# Patient Record
Sex: MALE | Race: WHITE | NOT HISPANIC OR LATINO | Employment: FULL TIME | URBAN - METROPOLITAN AREA
[De-identification: names, ages, dates, MRNs, and addresses within clinical notes are randomized per-mention and may not be internally consistent; named-entity substitution may affect disease eponyms.]

---

## 2017-05-05 ENCOUNTER — OFFICE VISIT (OUTPATIENT)
Dept: INTERNAL MEDICINE | Facility: CLINIC | Age: 27
End: 2017-05-05

## 2017-05-05 VITALS
DIASTOLIC BLOOD PRESSURE: 70 MMHG | SYSTOLIC BLOOD PRESSURE: 122 MMHG | WEIGHT: 166.7 LBS | OXYGEN SATURATION: 98 % | BODY MASS INDEX: 26.16 KG/M2 | HEIGHT: 67 IN | TEMPERATURE: 98.5 F | HEART RATE: 55 BPM

## 2017-05-05 DIAGNOSIS — L30.9 DERMATITIS: ICD-10-CM

## 2017-05-05 DIAGNOSIS — E55.9 VITAMIN D DEFICIENCY: ICD-10-CM

## 2017-05-05 DIAGNOSIS — R53.83 FATIGUE, UNSPECIFIED TYPE: ICD-10-CM

## 2017-05-05 DIAGNOSIS — E78.2 MIXED HYPERLIPIDEMIA: Primary | ICD-10-CM

## 2017-05-05 PROCEDURE — 99213 OFFICE O/P EST LOW 20 MIN: CPT | Performed by: FAMILY MEDICINE

## 2017-05-06 LAB
25(OH)D3+25(OH)D2 SERPL-MCNC: 22.2 NG/ML (ref 30–100)
BASOPHILS # BLD AUTO: 0.03 10*3/MM3 (ref 0–0.2)
BASOPHILS NFR BLD AUTO: 0.5 % (ref 0–1.5)
CHOLEST SERPL-MCNC: 279 MG/DL (ref 0–200)
EOSINOPHIL # BLD AUTO: 0.22 10*3/MM3 (ref 0–0.7)
EOSINOPHIL NFR BLD AUTO: 4 % (ref 0.3–6.2)
ERYTHROCYTE [DISTWIDTH] IN BLOOD BY AUTOMATED COUNT: 12.5 % (ref 11.5–14.5)
HCT VFR BLD AUTO: 46.2 % (ref 40.4–52.2)
HDLC SERPL-MCNC: 33 MG/DL (ref 40–60)
HGB BLD-MCNC: 15.8 G/DL (ref 13.7–17.6)
IMM GRANULOCYTES # BLD: 0 10*3/MM3 (ref 0–0.03)
IMM GRANULOCYTES NFR BLD: 0 % (ref 0–0.5)
LDLC SERPL CALC-MCNC: 225 MG/DL (ref 0–100)
LYMPHOCYTES # BLD AUTO: 2.2 10*3/MM3 (ref 0.9–4.8)
LYMPHOCYTES NFR BLD AUTO: 39.6 % (ref 19.6–45.3)
MCH RBC QN AUTO: 31.1 PG (ref 27–32.7)
MCHC RBC AUTO-ENTMCNC: 34.2 G/DL (ref 32.6–36.4)
MCV RBC AUTO: 90.9 FL (ref 79.8–96.2)
MONOCYTES # BLD AUTO: 0.43 10*3/MM3 (ref 0.2–1.2)
MONOCYTES NFR BLD AUTO: 7.7 % (ref 5–12)
NEUTROPHILS # BLD AUTO: 2.68 10*3/MM3 (ref 1.9–8.1)
NEUTROPHILS NFR BLD AUTO: 48.2 % (ref 42.7–76)
PLATELET # BLD AUTO: 207 10*3/MM3 (ref 140–500)
RBC # BLD AUTO: 5.08 10*6/MM3 (ref 4.6–6)
TRIGL SERPL-MCNC: 105 MG/DL (ref 0–150)
VLDLC SERPL CALC-MCNC: 21 MG/DL (ref 5–40)
WBC # BLD AUTO: 5.56 10*3/MM3 (ref 4.5–10.7)

## 2017-05-11 ENCOUNTER — TELEPHONE (OUTPATIENT)
Dept: INTERNAL MEDICINE | Facility: CLINIC | Age: 27
End: 2017-05-11

## 2017-05-11 DIAGNOSIS — E78.9 ELEVATED SERUM CHOLESTEROL: Primary | ICD-10-CM

## 2017-06-09 ENCOUNTER — OFFICE VISIT (OUTPATIENT)
Dept: ENDOCRINOLOGY | Age: 27
End: 2017-06-09

## 2017-06-09 VITALS
HEART RATE: 64 BPM | SYSTOLIC BLOOD PRESSURE: 120 MMHG | WEIGHT: 165 LBS | OXYGEN SATURATION: 98 % | HEIGHT: 67 IN | DIASTOLIC BLOOD PRESSURE: 70 MMHG | BODY MASS INDEX: 25.9 KG/M2

## 2017-06-09 DIAGNOSIS — E78.2 MIXED HYPERLIPIDEMIA: Primary | ICD-10-CM

## 2017-06-09 DIAGNOSIS — Z82.49 FAMILY HISTORY OF HEART ATTACK: ICD-10-CM

## 2017-06-09 LAB
ALBUMIN SERPL-MCNC: 5 G/DL (ref 3.5–5.2)
ALBUMIN/GLOB SERPL: 2.2 G/DL
ALP SERPL-CCNC: 95 U/L (ref 39–117)
ALT SERPL-CCNC: 24 U/L (ref 1–41)
AST SERPL-CCNC: 31 U/L (ref 1–40)
BILIRUB SERPL-MCNC: 0.3 MG/DL (ref 0.1–1.2)
BUN SERPL-MCNC: 17 MG/DL (ref 6–20)
BUN/CREAT SERPL: 16.2 (ref 7–25)
CALCIUM SERPL-MCNC: 10.1 MG/DL (ref 8.6–10.5)
CHLORIDE SERPL-SCNC: 101 MMOL/L (ref 98–107)
CHOLEST SERPL-MCNC: 278 MG/DL (ref 0–200)
CO2 SERPL-SCNC: 28.6 MMOL/L (ref 22–29)
CREAT SERPL-MCNC: 1.05 MG/DL (ref 0.76–1.27)
GLOBULIN SER CALC-MCNC: 2.3 GM/DL
GLUCOSE SERPL-MCNC: 91 MG/DL (ref 65–99)
HBA1C MFR BLD: 5.02 % (ref 4.8–5.6)
HDLC SERPL-MCNC: 37 MG/DL (ref 40–60)
LDLC SERPL CALC-MCNC: 224 MG/DL (ref 0–100)
POTASSIUM SERPL-SCNC: 4.8 MMOL/L (ref 3.5–5.2)
PROT SERPL-MCNC: 7.3 G/DL (ref 6–8.5)
SODIUM SERPL-SCNC: 141 MMOL/L (ref 136–145)
TRIGL SERPL-MCNC: 84 MG/DL (ref 0–150)
VLDLC SERPL CALC-MCNC: 16.8 MG/DL (ref 5–40)

## 2017-06-09 PROCEDURE — 99244 OFF/OP CNSLTJ NEW/EST MOD 40: CPT | Performed by: INTERNAL MEDICINE

## 2017-06-09 NOTE — PROGRESS NOTES
Chief Complaint   Patient presents with   • Hyperlipidemia   New Patient appointment / Elevated Serum Cholesterol     HPI  Jay Rasheed,27 y.o. WM is here as a new pt for hyperlipidemia. Consulted by Dr. Pichardo.   Per pt about a year ago he wanted his PCP to check his cholesterol levels as it runs in his family. During that period he was trying to body build with whey protein powders and eating a pound red meat but when he realized his elevated cholesterol he started watching diet.   Now he eats meat during week ends - about 4 ounces, drinks skimmed milk used to drink whole milk before, Drinks about 4 scoops of Pea protein with no cholesterol in it. 30 - 45 mins of exercise every single day.   Strong family hx of heart attacks in his grand parents - at age 50's.     [place] heart risk - 4%, again the study doesn't have his age group.     I have reviewed the patient's allergies, medicines, past medical hx, family hx and social hx in detail.    No past medical history on file.    Family History   Problem Relation Age of Onset   • Alcohol abuse Mother    • Irregular heart beat Father    • Hypertension Father    • Alcohol abuse Father    • Glaucoma Maternal Grandmother    • Stroke Maternal Grandfather    • Heart attack Paternal Grandfather    • Hypertension Paternal Grandfather    • Pancreatic cancer Paternal Grandfather    • Liver cancer Paternal Grandfather    • Nephrolithiasis Cousin        Social History     Social History   • Marital status: Unknown     Spouse name: N/A   • Number of children: 0   • Years of education: N/A     Occupational History   •       Social History Main Topics   • Smoking status: Never Smoker   • Smokeless tobacco: Never Used   • Alcohol use Yes      Comment: 3 per week   • Drug use: Not on file   • Sexual activity: Yes     Partners: Female     Other Topics Concern   • Not on file     Social History Narrative       Allergies   Allergen Reactions   • Sulfa Antibiotics Hives  "and Rash       No current outpatient prescriptions on file.     Review of Systems   Constitutional: Negative for fever.   HENT: Negative for facial swelling, nosebleeds, trouble swallowing and voice change.    Eyes: Negative for pain and redness.   Respiratory: Negative for shortness of breath and wheezing.    Cardiovascular: Positive for palpitations. Negative for leg swelling.   Gastrointestinal: Negative for abdominal pain, diarrhea and vomiting.   Endocrine: Negative for polydipsia and polyuria.   Genitourinary: Negative for decreased urine volume and frequency.   Musculoskeletal: Positive for neck pain. Negative for joint swelling.   Skin: Negative for rash.   Allergic/Immunologic: Negative for immunocompromised state.   Neurological: Negative for seizures and facial asymmetry.   Hematological: Does not bruise/bleed easily.   Psychiatric/Behavioral: Negative for agitation and confusion.       Objective:    /70  Pulse 64  Ht 67\" (170.2 cm)  Wt 165 lb (74.8 kg)  SpO2 98%  BMI 25.84 kg/m2    Physical Exam   Constitutional: He is oriented to person, place, and time. He appears well-nourished.   HENT:   Head: Normocephalic and atraumatic.   Eyes: Conjunctivae and EOM are normal.   No lipemia   Neck: Normal range of motion. Neck supple. Carotid bruit is not present. No thyromegaly present.   No acanthosis nigricans   Cardiovascular: Normal rate and normal heart sounds.    HR - 64   Pulmonary/Chest: Effort normal and breath sounds normal. No stridor. No respiratory distress. He has no wheezes.   Abdominal: Soft. Bowel sounds are normal. He exhibits no distension. There is no tenderness.   Musculoskeletal: He exhibits no edema or tenderness.   No xanthomas   Lymphadenopathy:     He has no cervical adenopathy.   Neurological: He is alert and oriented to person, place, and time.   Skin: Skin is warm and dry.   Psychiatric: He has a normal mood and affect. His behavior is normal.   Vitals reviewed.      Results " Review:    I reviewed the patient's new clinical results.    Office Visit on 05/05/2017   Component Date Value Ref Range Status   • Total Cholesterol 05/05/2017 279* 0 - 200 mg/dL Final   • Triglycerides 05/05/2017 105  0 - 150 mg/dL Final   • HDL Cholesterol 05/05/2017 33* 40 - 60 mg/dL Final   • VLDL Cholesterol 05/05/2017 21  5 - 40 mg/dL Final   • LDL Cholesterol  05/05/2017 225* 0 - 100 mg/dL Final   • 25 Hydroxy, Vitamin D 05/05/2017 22.2* 30.0 - 100.0 ng/mL Final    Comment: Reference Range for Total Vitamin D 25(OH)  Deficiency    <20.0 ng/mL  Insufficiency 21-29 ng/mL  Sufficiency    ng/mL  Toxicity      >100 ng/ml        • WBC 05/05/2017 5.56  4.50 - 10.70 10*3/mm3 Final   • RBC 05/05/2017 5.08  4.60 - 6.00 10*6/mm3 Final   • Hemoglobin 05/05/2017 15.8  13.7 - 17.6 g/dL Final   • Hematocrit 05/05/2017 46.2  40.4 - 52.2 % Final   • MCV 05/05/2017 90.9  79.8 - 96.2 fL Final   • MCH 05/05/2017 31.1  27.0 - 32.7 pg Final   • MCHC 05/05/2017 34.2  32.6 - 36.4 g/dL Final   • RDW 05/05/2017 12.5  11.5 - 14.5 % Final   • Platelets 05/05/2017 207  140 - 500 10*3/mm3 Final   • Neutrophil Rel % 05/05/2017 48.2  42.7 - 76.0 % Final   • Lymphocyte Rel % 05/05/2017 39.6  19.6 - 45.3 % Final   • Monocyte Rel % 05/05/2017 7.7  5.0 - 12.0 % Final   • Eosinophil Rel % 05/05/2017 4.0  0.3 - 6.2 % Final   • Basophil Rel % 05/05/2017 0.5  0.0 - 1.5 % Final   • Neutrophils Absolute 05/05/2017 2.68  1.90 - 8.10 10*3/mm3 Final   • Lymphocytes Absolute 05/05/2017 2.20  0.90 - 4.80 10*3/mm3 Final   • Monocytes Absolute 05/05/2017 0.43  0.20 - 1.20 10*3/mm3 Final   • Eosinophils Absolute 05/05/2017 0.22  0.00 - 0.70 10*3/mm3 Final   • Basophils Absolute 05/05/2017 0.03  0.00 - 0.20 10*3/mm3 Final   • Immature Granulocyte Rel % 05/05/2017 0.0  0.0 - 0.5 % Final   • Immature Grans Absolute 05/05/2017 0.00  0.00 - 0.03 10*3/mm3 Final       Jay was seen today for hyperlipidemia.    Diagnoses and all orders for this  visit:    Mixed hyperlipidemia  -     Hemoglobin A1c  -     Comprehensive Metabolic Panel  -     Lipid Panel  -     Lipid Panel; Future    Family history of heart attack  -     Hemoglobin A1c  -     Comprehensive Metabolic Panel  -     Lipid Panel  -     Lipid Panel; Future    Hyperlipidemia  Counseled patient on watching her fat diet.  Advised patient to continue to exercise.  Counseled patient on the adverse affects of trying to use protein powders for body building.  Will repeat the lipid panel today.  Based on the results would consider starting the patient on statin.  Counseled the patient on the side effects of statins and the benefit on them.    Calculated the [place] heart risk score for the patient and gave him the handout.  Explained the risks and benefits of starting a statin at his age.    Will check HbA1c, CMP to look at risk of diabetes and renal function and LFTs.    Thank you for asking me to see your patient Jay Rasheed in consultation.    31 minutes out of 60 minutes face to face spent in counseling the patient extensively on statins, side effects and cardiac risk in 10 years.    Mariia Menjivar MD  06/09/17

## 2017-06-10 NOTE — PROGRESS NOTES
Mail results to pt, no treatment changes at this time. LDL is getting better, follow the plan as discussed in clinic will recheck levels in 3 months

## 2017-09-07 ENCOUNTER — RESULTS ENCOUNTER (OUTPATIENT)
Dept: ENDOCRINOLOGY | Age: 27
End: 2017-09-07

## 2017-09-07 DIAGNOSIS — E78.2 MIXED HYPERLIPIDEMIA: ICD-10-CM

## 2017-09-07 DIAGNOSIS — Z82.49 FAMILY HISTORY OF HEART ATTACK: ICD-10-CM

## 2018-03-22 DIAGNOSIS — R73.03 PRE-DIABETES: ICD-10-CM

## 2018-03-22 DIAGNOSIS — E78.2 MIXED HYPERLIPIDEMIA: ICD-10-CM

## 2018-03-22 DIAGNOSIS — E78.5 HYPERLIPIDEMIA, UNSPECIFIED HYPERLIPIDEMIA TYPE: Primary | ICD-10-CM

## 2018-03-23 ENCOUNTER — OFFICE VISIT (OUTPATIENT)
Dept: INTERNAL MEDICINE | Facility: CLINIC | Age: 28
End: 2018-03-23

## 2018-03-23 ENCOUNTER — TELEPHONE (OUTPATIENT)
Dept: INTERNAL MEDICINE | Facility: CLINIC | Age: 28
End: 2018-03-23

## 2018-03-23 ENCOUNTER — HOSPITAL ENCOUNTER (OUTPATIENT)
Dept: GENERAL RADIOLOGY | Facility: HOSPITAL | Age: 28
Discharge: HOME | End: 2018-03-23
Admitting: FAMILY MEDICINE

## 2018-03-23 VITALS
WEIGHT: 177.3 LBS | RESPIRATION RATE: 16 BRPM | OXYGEN SATURATION: 98 % | DIASTOLIC BLOOD PRESSURE: 78 MMHG | BODY MASS INDEX: 27.83 KG/M2 | HEART RATE: 77 BPM | HEIGHT: 67 IN | TEMPERATURE: 98.7 F | SYSTOLIC BLOOD PRESSURE: 123 MMHG

## 2018-03-23 DIAGNOSIS — L72.9 SKIN CYST: Primary | ICD-10-CM

## 2018-03-23 DIAGNOSIS — M25.551 PAIN OF RIGHT HIP JOINT: ICD-10-CM

## 2018-03-23 PROCEDURE — 73502 X-RAY EXAM HIP UNI 2-3 VIEWS: CPT

## 2018-03-23 PROCEDURE — 99214 OFFICE O/P EST MOD 30 MIN: CPT | Performed by: FAMILY MEDICINE

## 2018-03-23 RX ORDER — MULTIVITAMIN WITH IRON
TABLET ORAL
COMMUNITY
End: 2018-04-20

## 2018-03-23 RX ORDER — IBUPROFEN 600 MG/1
600 TABLET ORAL EVERY 6 HOURS PRN
COMMUNITY
End: 2018-03-23

## 2018-03-23 RX ORDER — CHLORAL HYDRATE 500 MG
CAPSULE ORAL
COMMUNITY
End: 2018-04-20

## 2018-03-23 NOTE — PROGRESS NOTES
Jay Rasheed is a 28 y.o. male.      Assessment/Plan   Problem List Items Addressed This Visit        Nervous and Auditory    Pain of right hip joint    Overview     chronic         Relevant Orders    XR Hip With or Without Pelvis 2 - 3 View Right (Completed)       Musculoskeletal and Integument    Skin cyst - Primary    Relevant Medications    tretinoin (RETIN-A) 0.025 % cream      Other Visit Diagnoses    None.        and continue therapy with present situation for ongoing relief of his right hip girdle tenderness pain with radiating qualities to his right shoulder up with scrotum lesion does not improve  Follow-up results of x-ray normal        Chief Complaint   Patient presents with   • bump on right testicle     x 6 months, itches   • Hip Pain     right, x 8 years. PT in past but no help     Social History   Substance Use Topics   • Smoking status: Never Smoker   • Smokeless tobacco: Never Used   • Alcohol use Yes      Comment: 3 per week       History of Present Illness   Patient has noticed a lesion on his scrotum that has had some intermittent itching over the last 6 months he's tried to remove it with his wife is a nurse and has not had any success does not bleed and he has no other lesions he is concerned about there is no lumps in his testicles he is concerned about.  He refuses a testicular exam, and we discussed the significance of testicular cancer in his age group.  He also is quite relate that he has had right-sided hip discomfort for many years he is had multiple treatments without much success but is finally found a healer that is working with him for muscle coordination and control and seems to be improving his symptoms remarkably he would like spelled there is any other  form of therapy that would be covered through his insurance is depressed nasal seen is not covered through insurance.  He has been through physical therapy in the past without any benefit  The following portions of the  "patient's history were reviewed and updated as appropriate:PMHroutine: Social history , Past Medical History, Allergies, Current Medications, Active Problem List and Health Maintenance    Review of Systems   Constitutional: Negative.    Respiratory: Negative.    Cardiovascular: Negative.    Gastrointestinal: Negative.    Genitourinary: Negative.    Musculoskeletal: Positive for arthralgias, gait problem and myalgias.   Skin: Negative.        Objective   Vitals:    03/23/18 0906   BP: 123/78   BP Location: Left arm   Patient Position: Sitting   Cuff Size: Large Adult   Pulse: 77   Resp: 16   Temp: 98.7 °F (37.1 °C)   TempSrc: Oral   SpO2: 98%   Weight: 80.4 kg (177 lb 4.8 oz)   Height: 170.2 cm (67\")     Body mass index is 27.77 kg/m².  Physical Exam   Genitourinary:           Reviewed Data:  No visits with results within 1 Month(s) from this visit.   Latest known visit with results is:   Office Visit on 06/09/2017   Component Date Value Ref Range Status   • Hemoglobin A1C 06/09/2017 5.02  4.80 - 5.60 % Final    Comment: Hemoglobin A1C Ranges:  Increased Risk for Diabetes  5.7% to 6.4%  Diabetes                     >= 6.5%  Diabetic Goal                < 7.0%     • Glucose 06/09/2017 91  65 - 99 mg/dL Final   • BUN 06/09/2017 17  6 - 20 mg/dL Final   • Creatinine 06/09/2017 1.05  0.76 - 1.27 mg/dL Final   • eGFR Non  Am 06/09/2017 86  >60 mL/min/1.73 Final   • eGFR African Am 06/09/2017 104  >60 mL/min/1.73 Final   • BUN/Creatinine Ratio 06/09/2017 16.2  7.0 - 25.0 Final   • Sodium 06/09/2017 141  136 - 145 mmol/L Final   • Potassium 06/09/2017 4.8  3.5 - 5.2 mmol/L Final   • Chloride 06/09/2017 101  98 - 107 mmol/L Final   • Total CO2 06/09/2017 28.6  22.0 - 29.0 mmol/L Final   • Calcium 06/09/2017 10.1  8.6 - 10.5 mg/dL Final   • Total Protein 06/09/2017 7.3  6.0 - 8.5 g/dL Final   • Albumin 06/09/2017 5.00  3.50 - 5.20 g/dL Final   • Globulin 06/09/2017 2.3  gm/dL Final   • A/G Ratio 06/09/2017 2.2  g/dL " Final   • Total Bilirubin 06/09/2017 0.3  0.1 - 1.2 mg/dL Final   • Alkaline Phosphatase 06/09/2017 95  39 - 117 U/L Final   • AST (SGOT) 06/09/2017 31  1 - 40 U/L Final   • ALT (SGPT) 06/09/2017 24  1 - 41 U/L Final   • Total Cholesterol 06/09/2017 278* 0 - 200 mg/dL Final   • Triglycerides 06/09/2017 84  0 - 150 mg/dL Final   • HDL Cholesterol 06/09/2017 37* 40 - 60 mg/dL Final   • VLDL Cholesterol 06/09/2017 16.8  5 - 40 mg/dL Final   • LDL Cholesterol  06/09/2017 224* 0 - 100 mg/dL Final

## 2018-03-24 DIAGNOSIS — M25.551 PAIN OF RIGHT HIP JOINT: Primary | ICD-10-CM

## 2018-03-27 ENCOUNTER — RESULTS ENCOUNTER (OUTPATIENT)
Dept: ENDOCRINOLOGY | Age: 28
End: 2018-03-27

## 2018-03-27 DIAGNOSIS — E78.2 MIXED HYPERLIPIDEMIA: ICD-10-CM

## 2018-03-27 DIAGNOSIS — R73.03 PRE-DIABETES: ICD-10-CM

## 2018-03-27 DIAGNOSIS — E78.5 HYPERLIPIDEMIA, UNSPECIFIED HYPERLIPIDEMIA TYPE: ICD-10-CM

## 2018-03-29 ENCOUNTER — RESULTS ENCOUNTER (OUTPATIENT)
Dept: ENDOCRINOLOGY | Age: 28
End: 2018-03-29

## 2018-03-29 DIAGNOSIS — R73.03 PRE-DIABETES: ICD-10-CM

## 2018-03-29 DIAGNOSIS — E78.2 MIXED HYPERLIPIDEMIA: ICD-10-CM

## 2018-03-29 DIAGNOSIS — E78.5 HYPERLIPIDEMIA, UNSPECIFIED HYPERLIPIDEMIA TYPE: ICD-10-CM

## 2018-04-07 LAB
ALBUMIN SERPL-MCNC: 5 G/DL (ref 3.5–5.2)
ALBUMIN/GLOB SERPL: 2.4 G/DL
ALP SERPL-CCNC: 99 U/L (ref 39–117)
ALT SERPL-CCNC: 25 U/L (ref 1–41)
AST SERPL-CCNC: 35 U/L (ref 1–40)
BILIRUB SERPL-MCNC: 0.4 MG/DL (ref 0.1–1.2)
BUN SERPL-MCNC: 13 MG/DL (ref 6–20)
BUN/CREAT SERPL: 11.6 (ref 7–25)
CALCIUM SERPL-MCNC: 10.3 MG/DL (ref 8.6–10.5)
CHLORIDE SERPL-SCNC: 100 MMOL/L (ref 98–107)
CHOLEST SERPL-MCNC: 281 MG/DL (ref 0–200)
CO2 SERPL-SCNC: 26.6 MMOL/L (ref 22–29)
CREAT SERPL-MCNC: 1.12 MG/DL (ref 0.76–1.27)
GFR SERPLBLD CREATININE-BSD FMLA CKD-EPI: 79 ML/MIN/1.73
GFR SERPLBLD CREATININE-BSD FMLA CKD-EPI: 96 ML/MIN/1.73
GLOBULIN SER CALC-MCNC: 2.1 GM/DL
GLUCOSE SERPL-MCNC: 80 MG/DL (ref 65–99)
HBA1C MFR BLD: 5.19 % (ref 4.8–5.6)
HDLC SERPL-MCNC: 42 MG/DL (ref 40–60)
INTERPRETATION: NORMAL
LDLC SERPL CALC-MCNC: 230 MG/DL (ref 0–100)
Lab: NORMAL
POTASSIUM SERPL-SCNC: 4.4 MMOL/L (ref 3.5–5.2)
PROT SERPL-MCNC: 7.1 G/DL (ref 6–8.5)
SODIUM SERPL-SCNC: 141 MMOL/L (ref 136–145)
TRIGL SERPL-MCNC: 46 MG/DL (ref 0–150)
VLDLC SERPL CALC-MCNC: 9.2 MG/DL (ref 5–40)

## 2018-04-20 ENCOUNTER — OFFICE VISIT (OUTPATIENT)
Dept: ENDOCRINOLOGY | Age: 28
End: 2018-04-20

## 2018-04-20 VITALS
DIASTOLIC BLOOD PRESSURE: 70 MMHG | SYSTOLIC BLOOD PRESSURE: 128 MMHG | HEIGHT: 67 IN | OXYGEN SATURATION: 98 % | BODY MASS INDEX: 27.94 KG/M2 | HEART RATE: 67 BPM | WEIGHT: 178 LBS

## 2018-04-20 DIAGNOSIS — E78.2 MIXED HYPERLIPIDEMIA: Primary | ICD-10-CM

## 2018-04-20 PROCEDURE — 99213 OFFICE O/P EST LOW 20 MIN: CPT | Performed by: INTERNAL MEDICINE

## 2018-04-20 RX ORDER — ATORVASTATIN CALCIUM 20 MG/1
20 TABLET, FILM COATED ORAL DAILY
Qty: 30 TABLET | Refills: 11 | Status: SHIPPED | OUTPATIENT
Start: 2018-04-20 | End: 2018-08-03

## 2018-04-20 NOTE — PROGRESS NOTES
28 y.o.    Patient Care Team:  Micheal Pichardo MD as PCP - General (Family Medicine)  Micheal Pichardo MD as PCP - Family Medicine (Family Medicine)    Chief Complaint:    FOLLOW UP APPOINTMENT/ HYPERLIPIDEMIA  Subjective     HPI    Jay Rasheed,28 y.o. WM is here as a follow up pt for hyperlipidemia. Consulted by Dr. Pichardo.   Per pt about a year ago he wanted his PCP to check his cholesterol levels as it runs in his family.     Patient is continuing to exercise by weightlifting, not using any bodybuilding medications.  He is also watching his diet intake by following low-fat diet.  His weight remains stable.  His father had atrial fibrillation in his 40s, strong family history of heart attack in his grandparents at age 50.  Family  Heart risk calculated-4%,  this study does not have pt specific age group.     Interval History      The following portions of the patient's history were reviewed and updated as appropriate: allergies, current medications, past family history, past medical history, past social history, past surgical history and problem list.    No past medical history on file.  Family History   Problem Relation Age of Onset   • Alcohol abuse Mother    • Irregular heart beat Father    • Hypertension Father    • Alcohol abuse Father    • Glaucoma Maternal Grandmother    • Stroke Maternal Grandfather    • Heart attack Paternal Grandfather    • Hypertension Paternal Grandfather    • Pancreatic cancer Paternal Grandfather    • Liver cancer Paternal Grandfather    • Nephrolithiasis Cousin      Social History     Social History   • Marital status: Unknown     Spouse name: N/A   • Number of children: 0   • Years of education: N/A     Occupational History   •       Social History Main Topics   • Smoking status: Never Smoker   • Smokeless tobacco: Never Used   • Alcohol use Yes      Comment: 3 per week   • Drug use: Unknown   • Sexual activity: Yes     Partners: Female     Other Topics Concern   •  "Not on file     Social History Narrative   • No narrative on file     Allergies   Allergen Reactions   • Sulfa Antibiotics Hives and Rash       Current Outpatient Prescriptions:   •  Multiple Vitamin (MULTI-VITAMIN DAILY PO), Take  by mouth., Disp: , Rfl:   •  tretinoin (RETIN-A) 0.025 % cream, Apply  topically Every Night., Disp: 20 g, Rfl: 0  •  atorvastatin (LIPITOR) 20 MG tablet, Take 1 tablet by mouth Daily., Disp: 30 tablet, Rfl: 11        Review of Systems   Constitutional: Positive for fatigue. Negative for fever.   HENT: Negative for facial swelling, nosebleeds, trouble swallowing and voice change.    Eyes: Negative for pain and redness.   Respiratory: Positive for shortness of breath. Negative for wheezing.    Cardiovascular: Positive for palpitations. Negative for leg swelling.   Gastrointestinal: Negative for abdominal pain, diarrhea and vomiting.   Endocrine: Negative for polydipsia and polyuria.   Genitourinary: Negative for decreased urine volume and frequency.   Musculoskeletal: Negative for joint swelling and neck pain.   Skin: Negative for rash.   Allergic/Immunologic: Negative for immunocompromised state.   Neurological: Negative for seizures and facial asymmetry.   Hematological: Does not bruise/bleed easily.   Psychiatric/Behavioral: Negative for agitation and confusion.       Objective       Vitals:    04/20/18 1422   BP: 128/70   Pulse: 67   SpO2: 98%   Weight: 80.7 kg (178 lb)   Height: 170.2 cm (67\")     Body mass index is 27.88 kg/m².      Physical Exam   Gen exam - alert and oriented x 3  HEENT - thyroid palpable.   Resp - Clear to auscultation.   CVS - S1,S2 heard and no murmurs.   Abd - Non tender, BS heard.   Ext - No edema and intact pin prick and proprioception.     Results Review:     I reviewed the patient's new clinical results.    Medical records reviewed  Summary: done      Results Encounter on 03/29/2018   Component Date Value Ref Range Status   • Total Cholesterol 04/07/2018 " 281* 0 - 200 mg/dL Final   • Triglycerides 04/07/2018 46  0 - 150 mg/dL Final   • HDL Cholesterol 04/07/2018 42  40 - 60 mg/dL Final   • VLDL Cholesterol 04/07/2018 9.2  5 - 40 mg/dL Final   • LDL Cholesterol  04/07/2018 230* 0 - 100 mg/dL Final   • Glucose 04/07/2018 80  65 - 99 mg/dL Final   • BUN 04/07/2018 13  6 - 20 mg/dL Final   • Creatinine 04/07/2018 1.12  0.76 - 1.27 mg/dL Final   • eGFR Non  Am 04/07/2018 79  >60 mL/min/1.73 Final   • eGFR African Am 04/07/2018 96  >60 mL/min/1.73 Final   • BUN/Creatinine Ratio 04/07/2018 11.6  7.0 - 25.0 Final   • Sodium 04/07/2018 141  136 - 145 mmol/L Final   • Potassium 04/07/2018 4.4  3.5 - 5.2 mmol/L Final   • Chloride 04/07/2018 100  98 - 107 mmol/L Final   • Total CO2 04/07/2018 26.6  22.0 - 29.0 mmol/L Final   • Calcium 04/07/2018 10.3  8.6 - 10.5 mg/dL Final   • Total Protein 04/07/2018 7.1  6.0 - 8.5 g/dL Final   • Albumin 04/07/2018 5.00  3.50 - 5.20 g/dL Final   • Globulin 04/07/2018 2.1  gm/dL Final   • A/G Ratio 04/07/2018 2.4  g/dL Final   • Total Bilirubin 04/07/2018 0.4  0.1 - 1.2 mg/dL Final   • Alkaline Phosphatase 04/07/2018 99  39 - 117 U/L Final   • AST (SGOT) 04/07/2018 35  1 - 40 U/L Final   • ALT (SGPT) 04/07/2018 25  1 - 41 U/L Final   • Interpretation 04/07/2018 Note   Final   • PDF Image 04/07/2018 Not applicable   Final   • Hemoglobin A1C 04/07/2018 5.19  4.80 - 5.60 % Final    Comment: Hemoglobin A1C Ranges:  Increased Risk for Diabetes  5.7% to 6.4%  Diabetes                     >= 6.5%  Diabetic Goal                < 7.0%       Lab Results   Component Value Date    HGBA1C 5.19 04/06/2018    HGBA1C 5.02 06/09/2017     Lab Results   Component Value Date    CREATININE 1.12 04/06/2018     Imaging Results (most recent)     None                Assessment and Plan:    Jay was seen today for hyperlipidemia.    Diagnoses and all orders for this visit:    Mixed hyperlipidemia  -     Lipid Panel; Future    Other orders  -     atorvastatin  (LIPITOR) 20 MG tablet; Take 1 tablet by mouth Daily.        Hyperlipidemia  [place] brisk calculator places the patient as a low risk.  Discussed with the patient that we can hold off on starting the statin at this time but patient was extremely concerned about his family history of cardiac risk.  Given patient's concern and family history will proceed with Lipitor 20 mg oral daily at bedtime  Discussed with the patient the side effects of the medication.    Reviewed Lab results with the patient.       The total face to face time spent 15minutes with the patient,12minutes (greater than 50% of the total time) was spent counseling and coordination of the care on medication and its side effects

## 2018-07-19 ENCOUNTER — RESULTS ENCOUNTER (OUTPATIENT)
Dept: ENDOCRINOLOGY | Age: 28
End: 2018-07-19

## 2018-07-19 DIAGNOSIS — E78.2 MIXED HYPERLIPIDEMIA: ICD-10-CM

## 2018-07-24 ENCOUNTER — TELEPHONE (OUTPATIENT)
Dept: ENDOCRINOLOGY | Age: 28
End: 2018-07-24

## 2018-07-24 DIAGNOSIS — R53.83 FATIGUE, UNSPECIFIED TYPE: Primary | ICD-10-CM

## 2018-07-24 NOTE — TELEPHONE ENCOUNTER
Labs put in epic patient notifed      ----- Message from Jolie Weldon sent at 7/24/2018 11:00 AM EDT -----  Contact: PATIENT   PATIENT HAS CALLED IN REGARDS TO GETTING APPT RESCHEDULE FOR [facility] APPT. HOWEVER, PATIENT DOSE WANT TO MAKE SURE WE ARE TESTING FOR LIVER ENZYMES BECAUSE HE WENT TO ANOTHER PROVIDER AND THEY ARE HIGH.     PATIENT IS CONCERNED AND DID WANT TO GO OVER THESE RESULTS AS WELL WITH DR MARTINEZ.

## 2018-07-27 ENCOUNTER — LAB (OUTPATIENT)
Dept: ENDOCRINOLOGY | Age: 28
End: 2018-07-27

## 2018-07-27 DIAGNOSIS — R53.83 FATIGUE, UNSPECIFIED TYPE: ICD-10-CM

## 2018-07-28 LAB
ALBUMIN SERPL-MCNC: 4.9 G/DL (ref 3.5–5.2)
ALBUMIN/GLOB SERPL: 2.2 G/DL
ALP SERPL-CCNC: 117 U/L (ref 39–117)
ALT SERPL-CCNC: 56 U/L (ref 1–41)
AST SERPL-CCNC: 81 U/L (ref 1–40)
BILIRUB SERPL-MCNC: 0.4 MG/DL (ref 0.1–1.2)
BUN SERPL-MCNC: 19 MG/DL (ref 6–20)
BUN/CREAT SERPL: 18.4 (ref 7–25)
CALCIUM SERPL-MCNC: 9.5 MG/DL (ref 8.6–10.5)
CHLORIDE SERPL-SCNC: 101 MMOL/L (ref 98–107)
CHOLEST SERPL-MCNC: 193 MG/DL (ref 0–200)
CO2 SERPL-SCNC: 27.9 MMOL/L (ref 22–29)
CREAT SERPL-MCNC: 1.03 MG/DL (ref 0.76–1.27)
GLOBULIN SER CALC-MCNC: 2.2 GM/DL
GLUCOSE SERPL-MCNC: 80 MG/DL (ref 65–99)
HDLC SERPL-MCNC: 34 MG/DL (ref 40–60)
INTERPRETATION: NORMAL
LDLC SERPL CALC-MCNC: 147 MG/DL (ref 0–100)
POTASSIUM SERPL-SCNC: 4.4 MMOL/L (ref 3.5–5.2)
PROT SERPL-MCNC: 7.1 G/DL (ref 6–8.5)
SODIUM SERPL-SCNC: 141 MMOL/L (ref 136–145)
TRIGL SERPL-MCNC: 58 MG/DL (ref 0–150)
VLDLC SERPL CALC-MCNC: 11.6 MG/DL (ref 5–40)

## 2018-08-03 ENCOUNTER — OFFICE VISIT (OUTPATIENT)
Dept: ENDOCRINOLOGY | Age: 28
End: 2018-08-03

## 2018-08-03 VITALS
BODY MASS INDEX: 27.31 KG/M2 | HEIGHT: 67 IN | SYSTOLIC BLOOD PRESSURE: 130 MMHG | OXYGEN SATURATION: 97 % | DIASTOLIC BLOOD PRESSURE: 70 MMHG | WEIGHT: 174 LBS | HEART RATE: 77 BPM

## 2018-08-03 DIAGNOSIS — E78.2 MIXED HYPERLIPIDEMIA: Primary | ICD-10-CM

## 2018-08-03 PROCEDURE — 99214 OFFICE O/P EST MOD 30 MIN: CPT | Performed by: INTERNAL MEDICINE

## 2018-08-03 NOTE — PROGRESS NOTES
28 y.o.    Patient Care Team:  Micheal Pichardo MD as PCP - General (Family Medicine)  Micheal Pichardo MD as PCP - Family Medicine (Family Medicine)    Chief Complaint:    FOLLOW UP/HYPERLIPIDEMIA  Subjective     HPI    Jay Rasheed,28 y.o. WM is here as a follow up pt for hyperlipidemia. Consulted by Dr. Pichardo.   Per pt about a year ago he wanted his PCP to check his cholesterol levels as it runs in his family.     During last visit based on patient's cholesterol levels was started on Lipitor 20 mg oral daily.  Patient has been tolerating the medication with no issues but in his latest blood work up was noted to have elevated liver function tests.  Patient also reports that he has been exercising by weightlifting but is also using over-the-counter protein and bodybuilding supplements to promote with muscle groups.  He is also watching low-fat diet, his weight is relatively stable.  No significant nausea or vomiting.  He does have a strong family history of cardiac disease in his father and his grandfather at age 50s.  [place] heart risk for calculation was 4%, but this study does not have patients specific age group.       Reviewed primary care physician's/consulting physician documentation and lab results :     Interval History      The following portions of the patient's history were reviewed and updated as appropriate: allergies, current medications, past family history, past medical history, past social history, past surgical history and problem list.    No past medical history on file.  Family History   Problem Relation Age of Onset   • Alcohol abuse Mother    • Irregular heart beat Father    • Hypertension Father    • Alcohol abuse Father    • Glaucoma Maternal Grandmother    • Stroke Maternal Grandfather    • Heart attack Paternal Grandfather    • Hypertension Paternal Grandfather    • Pancreatic cancer Paternal Grandfather    • Liver cancer Paternal Grandfather    • Nephrolithiasis Cousin      Social  "History     Social History   • Marital status: Unknown     Spouse name: N/A   • Number of children: 0   • Years of education: N/A     Occupational History   •       Social History Main Topics   • Smoking status: Never Smoker   • Smokeless tobacco: Never Used   • Alcohol use Yes      Comment: 3 per week   • Drug use: Unknown   • Sexual activity: Yes     Partners: Female     Other Topics Concern   • Not on file     Social History Narrative   • No narrative on file     Allergies   Allergen Reactions   • Sulfa Antibiotics Hives and Rash     No current outpatient prescriptions on file.        Review of Systems   Constitutional: Positive for fatigue. Negative for appetite change and fever.   Eyes: Negative for visual disturbance.   Respiratory: Negative for shortness of breath.    Cardiovascular: Negative for palpitations and leg swelling.   Gastrointestinal: Negative for abdominal pain and vomiting.   Endocrine: Negative for polydipsia and polyuria.   Musculoskeletal: Positive for joint swelling (hand) and neck pain.   Skin: Negative for rash.   Neurological: Negative for weakness and numbness.   Psychiatric/Behavioral: Negative for behavioral problems.       Objective       Vitals:    08/03/18 1309   BP: 130/70   Pulse: 77   SpO2: 97%   Weight: 78.9 kg (174 lb)   Height: 170.2 cm (67\")     Body mass index is 27.25 kg/m².      Physical Exam   Constitutional: He is oriented to person, place, and time. He appears well-nourished.   HENT:   Head: Normocephalic and atraumatic.   Eyes: Conjunctivae and EOM are normal. No scleral icterus.   Neck: No thyromegaly present.   Cardiovascular: Normal rate and regular rhythm.    Pulmonary/Chest: Effort normal and breath sounds normal. No stridor. No respiratory distress. He has no wheezes.   Abdominal: Soft. Bowel sounds are normal. He exhibits no distension. There is no tenderness.   Musculoskeletal: He exhibits no edema or deformity.   Lymphadenopathy:     He has no " cervical adenopathy.   Neurological: He is alert and oriented to person, place, and time.   Skin: Skin is warm and dry. No rash noted. He is not diaphoretic.   Psychiatric: He has a normal mood and affect.   Vitals reviewed.    Results Review:     I reviewed the patient's new clinical results and mentioned them above in HPI and in plan as well.    Medical records reviewed  Summary: done      Lab on 07/27/2018   Component Date Value Ref Range Status   • Glucose 07/27/2018 80  65 - 99 mg/dL Final   • BUN 07/27/2018 19  6 - 20 mg/dL Final   • Creatinine 07/27/2018 1.03  0.76 - 1.27 mg/dL Final   • eGFR Non  Am 07/27/2018 87  >60 mL/min/1.73 Final   • eGFR African Am 07/27/2018 106  >60 mL/min/1.73 Final   • BUN/Creatinine Ratio 07/27/2018 18.4  7.0 - 25.0 Final   • Sodium 07/27/2018 141  136 - 145 mmol/L Final   • Potassium 07/27/2018 4.4  3.5 - 5.2 mmol/L Final   • Chloride 07/27/2018 101  98 - 107 mmol/L Final   • Total CO2 07/27/2018 27.9  22.0 - 29.0 mmol/L Final   • Calcium 07/27/2018 9.5  8.6 - 10.5 mg/dL Final   • Total Protein 07/27/2018 7.1  6.0 - 8.5 g/dL Final   • Albumin 07/27/2018 4.90  3.50 - 5.20 g/dL Final   • Globulin 07/27/2018 2.2  gm/dL Final   • A/G Ratio 07/27/2018 2.2  g/dL Final   • Total Bilirubin 07/27/2018 0.4  0.1 - 1.2 mg/dL Final   • Alkaline Phosphatase 07/27/2018 117  39 - 117 U/L Final   • AST (SGOT) 07/27/2018 81* 1 - 40 U/L Final   • ALT (SGPT) 07/27/2018 56* 1 - 41 U/L Final   • Total Cholesterol 07/27/2018 193  0 - 200 mg/dL Final   • Triglycerides 07/27/2018 58  0 - 150 mg/dL Final   • HDL Cholesterol 07/27/2018 34* 40 - 60 mg/dL Final   • VLDL Cholesterol 07/27/2018 11.6  5 - 40 mg/dL Final   • LDL Cholesterol  07/27/2018 147* 0 - 100 mg/dL Final   • Interpretation 07/27/2018 Note   Final    Supplemental report is available.     Lab Results   Component Value Date    HGBA1C 5.19 04/06/2018    HGBA1C 5.02 06/09/2017     Lab Results   Component Value Date    CREATININE 1.03  "07/27/2018     Imaging Results (most recent)     None                Assessment and Plan:    Jay was seen today for hyperlipidemia.    Diagnoses and all orders for this visit:    Mixed hyperlipidemia  -     Comprehensive Metabolic Panel; Future  -     Comprehensive Metabolic Panel; Future  -     Lipid Panel; Future  -     Hepatitis Panel, Acute; Future        Hyperlipidemia  Advised the patient to watch his fat intake and exercise  Will stop Lipitor due to the liver enzyme elevation.    Abnormal liver function tests  Will check hepatitis panel.  Will check the liver function tests in the next 4-6 weeks.  Explained to the patient that most of the cholesterol medications could result in elevation in his liver function but we could try another statin and monitor his LFTS in the next 4 weeks on the statin.    Might consider Crestor  Patient also reports that he will discuss with his family members and see what statins they are using.     Counseled the patient to stop using OTC protein supplements as they could be having agents that could cause liver and renal dysfunction.    Reviewed Lab results with the patient.             Mariia Menjivar MD  08/03/18    EMR Dragon / transcription disclaimer:     \"Dictated utilizing Dragon dictation\".  "

## 2018-09-14 ENCOUNTER — LAB (OUTPATIENT)
Dept: ENDOCRINOLOGY | Age: 28
End: 2018-09-14

## 2018-09-14 ENCOUNTER — RESULTS ENCOUNTER (OUTPATIENT)
Dept: ENDOCRINOLOGY | Age: 28
End: 2018-09-14

## 2018-09-14 DIAGNOSIS — E78.2 MIXED HYPERLIPIDEMIA: ICD-10-CM

## 2018-09-15 LAB
ALBUMIN SERPL-MCNC: 4.9 G/DL (ref 3.5–5.2)
ALBUMIN/GLOB SERPL: 2.6 G/DL
ALP SERPL-CCNC: 131 U/L (ref 39–117)
ALT SERPL-CCNC: 29 U/L (ref 1–41)
AST SERPL-CCNC: 24 U/L (ref 1–40)
BILIRUB SERPL-MCNC: 0.2 MG/DL (ref 0.1–1.2)
BUN SERPL-MCNC: 25 MG/DL (ref 6–20)
BUN/CREAT SERPL: 30.5 (ref 7–25)
CALCIUM SERPL-MCNC: 9.8 MG/DL (ref 8.6–10.5)
CHLORIDE SERPL-SCNC: 102 MMOL/L (ref 98–107)
CHOLEST SERPL-MCNC: 319 MG/DL (ref 0–200)
CO2 SERPL-SCNC: 25.9 MMOL/L (ref 22–29)
CREAT SERPL-MCNC: 0.82 MG/DL (ref 0.76–1.27)
GLOBULIN SER CALC-MCNC: 1.9 GM/DL
GLUCOSE SERPL-MCNC: 80 MG/DL (ref 65–99)
HDLC SERPL-MCNC: 32 MG/DL (ref 40–60)
INTERPRETATION: NORMAL
LDLC SERPL CALC-MCNC: 209 MG/DL (ref 0–100)
POTASSIUM SERPL-SCNC: 4.5 MMOL/L (ref 3.5–5.2)
PROT SERPL-MCNC: 6.8 G/DL (ref 6–8.5)
SODIUM SERPL-SCNC: 142 MMOL/L (ref 136–145)
TRIGL SERPL-MCNC: 389 MG/DL (ref 0–150)
VLDLC SERPL CALC-MCNC: 77.8 MG/DL (ref 5–40)

## 2019-01-23 DIAGNOSIS — E78.5 HYPERLIPIDEMIA, UNSPECIFIED HYPERLIPIDEMIA TYPE: Primary | ICD-10-CM

## 2019-01-25 ENCOUNTER — RESULTS ENCOUNTER (OUTPATIENT)
Dept: ENDOCRINOLOGY | Age: 29
End: 2019-01-25

## 2019-01-25 DIAGNOSIS — E78.5 HYPERLIPIDEMIA, UNSPECIFIED HYPERLIPIDEMIA TYPE: ICD-10-CM

## 2019-01-26 LAB
ALBUMIN SERPL-MCNC: 4.7 G/DL (ref 3.5–5.2)
ALBUMIN/GLOB SERPL: 2.2 G/DL
ALP SERPL-CCNC: 155 U/L (ref 39–117)
ALT SERPL-CCNC: 30 U/L (ref 1–41)
AST SERPL-CCNC: 37 U/L (ref 1–40)
BILIRUB SERPL-MCNC: 0.3 MG/DL (ref 0.1–1.2)
BUN SERPL-MCNC: 15 MG/DL (ref 6–20)
BUN/CREAT SERPL: 14.4 (ref 7–25)
CALCIUM SERPL-MCNC: 10.1 MG/DL (ref 8.6–10.5)
CHLORIDE SERPL-SCNC: 100 MMOL/L (ref 98–107)
CHOLEST SERPL-MCNC: 288 MG/DL (ref 0–200)
CO2 SERPL-SCNC: 28.7 MMOL/L (ref 22–29)
CREAT SERPL-MCNC: 1.04 MG/DL (ref 0.76–1.27)
GLOBULIN SER CALC-MCNC: 2.1 GM/DL
GLUCOSE SERPL-MCNC: 85 MG/DL (ref 65–99)
HDLC SERPL-MCNC: 33 MG/DL (ref 40–60)
INTERPRETATION: NORMAL
LDLC SERPL CALC-MCNC: 240 MG/DL (ref 0–100)
POTASSIUM SERPL-SCNC: 4.4 MMOL/L (ref 3.5–5.2)
PROT SERPL-MCNC: 6.8 G/DL (ref 6–8.5)
SODIUM SERPL-SCNC: 139 MMOL/L (ref 136–145)
TRIGL SERPL-MCNC: 76 MG/DL (ref 0–150)
VLDLC SERPL CALC-MCNC: 15.2 MG/DL (ref 5–40)

## 2019-02-08 ENCOUNTER — OFFICE VISIT (OUTPATIENT)
Dept: ENDOCRINOLOGY | Age: 29
End: 2019-02-08

## 2019-02-08 VITALS
DIASTOLIC BLOOD PRESSURE: 88 MMHG | SYSTOLIC BLOOD PRESSURE: 130 MMHG | OXYGEN SATURATION: 99 % | HEART RATE: 74 BPM | BODY MASS INDEX: 27.47 KG/M2 | WEIGHT: 175 LBS | HEIGHT: 67 IN

## 2019-02-08 DIAGNOSIS — E78.2 MIXED HYPERLIPIDEMIA: Primary | ICD-10-CM

## 2019-02-08 PROCEDURE — 99213 OFFICE O/P EST LOW 20 MIN: CPT | Performed by: INTERNAL MEDICINE

## 2019-02-08 RX ORDER — ROSUVASTATIN CALCIUM 10 MG/1
10 TABLET, COATED ORAL NIGHTLY
Qty: 30 TABLET | Refills: 11 | Status: SHIPPED | OUTPATIENT
Start: 2019-02-08 | End: 2019-05-17 | Stop reason: SDUPTHER

## 2019-02-08 NOTE — PROGRESS NOTES
28 y.o.    Patient Care Team:  Micheal Pichardo MD as PCP - General (Family Medicine)  Micheal Pichardo MD as PCP - Family Medicine (Family Medicine)    Chief Complaint:    6 MONTH FOLLOW UP/ HYPERLIPIDEMIA  Subjective     HPI    Jay Rasheed,28 y.o. WM is here as a follow up pt for hyperlipidemia. Consulted by Dr. Pichardo.   Per pt about a year ago he wanted his PCP to check his cholesterol levels as it runs in his family.      During last visit based on patient's cholesterol levels was started on Lipitor 20 mg oral daily.  Patient has been tolerating the medication with no issues but in his latest blood work up was noted to have elevated liver function tests.  Repeat BW off lipitor showed normal LFTs.    Today in clinic pt reports that he has been watching diet, exercising as much as he can. Not otc diet or supplements.   No significant nausea or vomiting.  He does have a strong family history of cardiac disease in his father and his grandfather at age 50s.  [place] heart risk for calculation was 4%, but this study does not have patients specific age group.    Reviewed primary care physician's/consulting physician documentation and lab results :     Interval History      The following portions of the patient's history were reviewed and updated as appropriate: allergies, current medications, past family history, past medical history, past social history, past surgical history and problem list.    History reviewed. No pertinent past medical history.  Family History   Problem Relation Age of Onset   • Alcohol abuse Mother    • Irregular heart beat Father    • Hypertension Father    • Alcohol abuse Father    • Glaucoma Maternal Grandmother    • Stroke Maternal Grandfather    • Heart attack Paternal Grandfather    • Hypertension Paternal Grandfather    • Pancreatic cancer Paternal Grandfather    • Liver cancer Paternal Grandfather    • Nephrolithiasis Cousin      Social History     Socioeconomic History   • Marital  "status: Unknown     Spouse name: Not on file   • Number of children: 0   • Years of education: Not on file   • Highest education level: Not on file   Social Needs   • Financial resource strain: Not on file   • Food insecurity - worry: Not on file   • Food insecurity - inability: Not on file   • Transportation needs - medical: Not on file   • Transportation needs - non-medical: Not on file   Occupational History   • Occupation:    Tobacco Use   • Smoking status: Never Smoker   • Smokeless tobacco: Never Used   Substance and Sexual Activity   • Alcohol use: Yes     Comment: 3 per week   • Drug use: Not on file   • Sexual activity: Yes     Partners: Female   Other Topics Concern   • Not on file   Social History Narrative   • Not on file     Allergies   Allergen Reactions   • Sulfa Antibiotics Hives and Rash       Current Outpatient Medications:   •  rosuvastatin (CRESTOR) 10 MG tablet, Take 1 tablet by mouth Every Night., Disp: 30 tablet, Rfl: 11        Review of Systems   Constitutional: Negative for appetite change, fatigue and fever.   Eyes: Negative for visual disturbance.   Respiratory: Negative for shortness of breath.    Cardiovascular: Negative for palpitations and leg swelling.   Gastrointestinal: Negative for abdominal pain and vomiting.   Endocrine: Negative for polydipsia and polyuria.   Musculoskeletal: Negative for joint swelling and neck pain.   Skin: Negative for rash.   Neurological: Negative for weakness and numbness.   Psychiatric/Behavioral: Negative for behavioral problems.       Objective       Vitals:    02/08/19 1432   BP: 130/88   Pulse: 74   SpO2: 99%   Weight: 79.4 kg (175 lb)   Height: 170.2 cm (67\")     Body mass index is 27.41 kg/m².      Physical Exam   Constitutional: He is oriented to person, place, and time. He appears well-nourished.   HENT:   Head: Normocephalic and atraumatic.   Eyes: Conjunctivae and EOM are normal. No scleral icterus.   Neck: No thyromegaly " present.   Cardiovascular: Normal rate and regular rhythm.   Pulmonary/Chest: Effort normal and breath sounds normal. No stridor. No respiratory distress. He has no wheezes.   Abdominal: Soft. Bowel sounds are normal. He exhibits no distension. There is no tenderness.   Musculoskeletal: He exhibits no edema or deformity.   Lymphadenopathy:     He has no cervical adenopathy.   Neurological: He is alert and oriented to person, place, and time.   Skin: Skin is warm and dry. No rash noted. He is not diaphoretic.   Psychiatric: He has a normal mood and affect.   Vitals reviewed.    Results Review:     I reviewed the patient's new clinical results and mentioned them above in HPI and in plan as well.    Medical records reviewed  Summary: done      Results Encounter on 01/25/2019   Component Date Value Ref Range Status   • Total Cholesterol 01/25/2019 288* 0 - 200 mg/dL Final   • Triglycerides 01/25/2019 76  0 - 150 mg/dL Final   • HDL Cholesterol 01/25/2019 33* 40 - 60 mg/dL Final   • VLDL Cholesterol 01/25/2019 15.2  5 - 40 mg/dL Final   • LDL Cholesterol  01/25/2019 240* 0 - 100 mg/dL Final   • Glucose 01/25/2019 85  65 - 99 mg/dL Final   • BUN 01/25/2019 15  6 - 20 mg/dL Final   • Creatinine 01/25/2019 1.04  0.76 - 1.27 mg/dL Final   • eGFR Non  Am 01/25/2019 86  >60 mL/min/1.73 Final   • eGFR African Am 01/25/2019 105  >60 mL/min/1.73 Final   • BUN/Creatinine Ratio 01/25/2019 14.4  7.0 - 25.0 Final   • Sodium 01/25/2019 139  136 - 145 mmol/L Final   • Potassium 01/25/2019 4.4  3.5 - 5.2 mmol/L Final   • Chloride 01/25/2019 100  98 - 107 mmol/L Final   • Total CO2 01/25/2019 28.7  22.0 - 29.0 mmol/L Final   • Calcium 01/25/2019 10.1  8.6 - 10.5 mg/dL Final   • Total Protein 01/25/2019 6.8  6.0 - 8.5 g/dL Final   • Albumin 01/25/2019 4.70  3.50 - 5.20 g/dL Final   • Globulin 01/25/2019 2.1  gm/dL Final   • A/G Ratio 01/25/2019 2.2  g/dL Final   • Total Bilirubin 01/25/2019 0.3  0.1 - 1.2 mg/dL Final   • Alkaline  "Phosphatase 01/25/2019 155* 39 - 117 U/L Final   • AST (SGOT) 01/25/2019 37  1 - 40 U/L Final   • ALT (SGPT) 01/25/2019 30  1 - 41 U/L Final   • Interpretation 01/25/2019 Note   Final    Supplemental report is available.     Lab Results   Component Value Date    HGBA1C 5.19 04/06/2018    HGBA1C 5.02 06/09/2017     Lab Results   Component Value Date    CREATININE 1.04 01/25/2019     Imaging Results (most recent)     None                Assessment and Plan:    Jay was seen today for abnormal lab.    Diagnoses and all orders for this visit:    Mixed hyperlipidemia  -     TSH; Future  -     T4, Free; Future  -     Basic Metabolic Panel; Future  -     Lipid Panel; Future  -     Vitamin B12 & Folate; Future  -     Vitamin D 25 Hydroxy; Future  -     Hepatic Function Panel; Future    Other orders  -     rosuvastatin (CRESTOR) 10 MG tablet; Take 1 tablet by mouth Every Night.      Hyperlipidemia   Levels are worse.   Will start crestor 10 mg po daily.   Will check LFTs in 6 weeks.   Will check lipid panel in 3 months      Reviewed Lab results with the patient.             Mariia Menjivar MD  02/08/19    EMR Dragon / transcription disclaimer:     \"Dictated utilizing Dragon dictation\".  "

## 2019-03-22 ENCOUNTER — RESULTS ENCOUNTER (OUTPATIENT)
Dept: ENDOCRINOLOGY | Age: 29
End: 2019-03-22

## 2019-03-22 DIAGNOSIS — E78.2 MIXED HYPERLIPIDEMIA: ICD-10-CM

## 2019-03-23 LAB
ALBUMIN SERPL-MCNC: 4.8 G/DL (ref 3.5–5.2)
ALP SERPL-CCNC: 145 U/L (ref 39–117)
ALT SERPL-CCNC: 43 U/L (ref 1–41)
AST SERPL-CCNC: 39 U/L (ref 1–40)
BILIRUB DIRECT SERPL-MCNC: <0.2 MG/DL (ref 0–0.3)
BILIRUB SERPL-MCNC: 0.2 MG/DL (ref 0.2–1.2)
PROT SERPL-MCNC: 6.9 G/DL (ref 6–8.5)

## 2019-05-03 ENCOUNTER — LAB (OUTPATIENT)
Dept: ENDOCRINOLOGY | Age: 29
End: 2019-05-03

## 2019-05-03 DIAGNOSIS — E78.2 MIXED HYPERLIPIDEMIA: ICD-10-CM

## 2019-05-04 LAB
25(OH)D3+25(OH)D2 SERPL-MCNC: 25.8 NG/ML (ref 30–100)
ALBUMIN SERPL-MCNC: 4.9 G/DL (ref 3.5–5.2)
ALBUMIN/GLOB SERPL: 2 G/DL
ALP SERPL-CCNC: 153 U/L (ref 39–117)
ALT SERPL-CCNC: 31 U/L (ref 1–41)
AST SERPL-CCNC: 32 U/L (ref 1–40)
BILIRUB SERPL-MCNC: 0.3 MG/DL (ref 0.2–1.2)
BUN SERPL-MCNC: 22 MG/DL (ref 6–20)
BUN/CREAT SERPL: 21.8 (ref 7–25)
CALCIUM SERPL-MCNC: 10.5 MG/DL (ref 8.6–10.5)
CHLORIDE SERPL-SCNC: 98 MMOL/L (ref 98–107)
CHOLEST SERPL-MCNC: 243 MG/DL (ref 0–200)
CO2 SERPL-SCNC: 26.5 MMOL/L (ref 22–29)
CREAT SERPL-MCNC: 1.01 MG/DL (ref 0.76–1.27)
FOLATE SERPL-MCNC: 11.9 NG/ML (ref 4.78–24.2)
GLOBULIN SER CALC-MCNC: 2.4 GM/DL
GLUCOSE SERPL-MCNC: 82 MG/DL (ref 65–99)
HDLC SERPL-MCNC: 40 MG/DL (ref 40–60)
INTERPRETATION: NORMAL
LDLC SERPL CALC-MCNC: 186 MG/DL (ref 0–100)
POTASSIUM SERPL-SCNC: 4.7 MMOL/L (ref 3.5–5.2)
PROT SERPL-MCNC: 7.3 G/DL (ref 6–8.5)
SODIUM SERPL-SCNC: 138 MMOL/L (ref 136–145)
T4 FREE SERPL-MCNC: 1.14 NG/DL (ref 0.93–1.7)
TRIGL SERPL-MCNC: 85 MG/DL (ref 0–150)
TSH SERPL DL<=0.005 MIU/L-ACNC: 1.57 MIU/ML (ref 0.27–4.2)
VIT B12 SERPL-MCNC: 875 PG/ML (ref 211–946)
VLDLC SERPL CALC-MCNC: 17 MG/DL

## 2019-05-17 ENCOUNTER — OFFICE VISIT (OUTPATIENT)
Dept: ENDOCRINOLOGY | Age: 29
End: 2019-05-17

## 2019-05-17 VITALS
DIASTOLIC BLOOD PRESSURE: 62 MMHG | HEART RATE: 63 BPM | BODY MASS INDEX: 26.84 KG/M2 | OXYGEN SATURATION: 99 % | HEIGHT: 67 IN | SYSTOLIC BLOOD PRESSURE: 120 MMHG | WEIGHT: 171 LBS

## 2019-05-17 DIAGNOSIS — E78.2 MIXED HYPERLIPIDEMIA: Primary | ICD-10-CM

## 2019-05-17 PROCEDURE — 99214 OFFICE O/P EST MOD 30 MIN: CPT | Performed by: INTERNAL MEDICINE

## 2019-05-17 RX ORDER — ROSUVASTATIN CALCIUM 10 MG/1
10 TABLET, COATED ORAL NIGHTLY
Qty: 30 TABLET | Refills: 11 | Status: SHIPPED | OUTPATIENT
Start: 2019-05-17 | End: 2020-11-24

## 2019-05-17 NOTE — PROGRESS NOTES
29 y.o.    Patient Care Team:  Micheal Pichardo MD as PCP - General (Family Medicine)  Micheal Pichardo MD as PCP - Family Medicine (Family Medicine)    Chief Complaint:    6 MONTH FOLLOW UP/ HYPERLIPIDEMIA  Subjective     HPI       Jay Wili,29 y.o. WM is here as a follow up pt for hyperlipidemia. Consulted by Dr. Pichardo.   Per pt about a year ago he wanted his PCP to check his cholesterol levels as it runs in his family.     During his prior visit patient was started on Lipitor 20 mg oral daily but when his liver function tests were noted to be elevated Lipitor was discontinued.  Patient was then started on Crestor 5 mg oral daily.    Today in the office patient reports that he is tolerating the medication with no side effects, no issues with muscle aches.  No nausea or vomiting.  Patient does have a strong family history.  Disease in his father and his grandfather at age 50s.  No history of smoking.  [place] heart risk for calculation was 4%, but this study does not have patients specific age group.     Reviewed primary care physician's/consulting physician documentation and lab results :     Interval History      The following portions of the patient's history were reviewed and updated as appropriate: allergies, current medications, past family history, past medical history, past social history, past surgical history and problem list.    History reviewed. No pertinent past medical history.  Family History   Problem Relation Age of Onset   • Alcohol abuse Mother    • Irregular heart beat Father    • Hypertension Father    • Alcohol abuse Father    • Glaucoma Maternal Grandmother    • Stroke Maternal Grandfather    • Heart attack Paternal Grandfather    • Hypertension Paternal Grandfather    • Pancreatic cancer Paternal Grandfather    • Liver cancer Paternal Grandfather    • Nephrolithiasis Cousin      Social History     Socioeconomic History   • Marital status: Unknown     Spouse name: Not on file   •  "Number of children: 0   • Years of education: Not on file   • Highest education level: Not on file   Occupational History   • Occupation:    Tobacco Use   • Smoking status: Never Smoker   • Smokeless tobacco: Never Used   Substance and Sexual Activity   • Alcohol use: Yes     Comment: 3 per week   • Sexual activity: Yes     Partners: Female     Allergies   Allergen Reactions   • Sulfa Antibiotics Hives and Rash       Current Outpatient Medications:   •  Cholecalciferol (VITAMIN D) 1000 units tablet, Take 1,000 Units by mouth Daily., Disp: , Rfl:   •  rosuvastatin (CRESTOR) 10 MG tablet, Take 1 tablet by mouth Every Night., Disp: 30 tablet, Rfl: 11        Review of Systems   Constitutional: Negative for appetite change and fatigue.   Eyes: Negative for visual disturbance.   Respiratory: Negative for shortness of breath.    Cardiovascular: Negative for palpitations and leg swelling.   Gastrointestinal: Negative for abdominal pain and vomiting.   Endocrine: Negative for polydipsia and polyuria.   Musculoskeletal: Negative for joint swelling and neck pain.   Skin: Negative for rash.   Neurological: Negative for weakness and numbness.   Psychiatric/Behavioral: Negative for behavioral problems.       Objective       Vitals:    05/17/19 1407   BP: 120/62   Pulse: 63   SpO2: 99%   Weight: 77.6 kg (171 lb)   Height: 170.2 cm (67\")     Body mass index is 26.78 kg/m².      Physical Exam   Constitutional: He is oriented to person, place, and time. He appears well-nourished.   HENT:   Head: Normocephalic and atraumatic.   Eyes: Conjunctivae and EOM are normal. No scleral icterus.   Neck: No thyromegaly present.   Cardiovascular: Normal rate and regular rhythm.   Pulmonary/Chest: Effort normal and breath sounds normal. No stridor. No respiratory distress. He has no wheezes.   Abdominal: Soft. Bowel sounds are normal. He exhibits no distension. There is no tenderness.   Musculoskeletal: He exhibits no edema or " deformity.   Lymphadenopathy:     He has no cervical adenopathy.   Neurological: He is alert and oriented to person, place, and time.   Skin: Skin is warm and dry. No rash noted. He is not diaphoretic.   Psychiatric: He has a normal mood and affect.   Vitals reviewed.    Results Review:     I reviewed the patient's new clinical results and mentioned them above in HPI and in plan as well.    Medical records reviewed  Summary:done      Lab on 05/03/2019   Component Date Value Ref Range Status   • 25 Hydroxy, Vitamin D 05/03/2019 25.8* 30.0 - 100.0 ng/ml Final    Comment: Reference Range for Total Vitamin D 25(OH)  Deficiency <20.0 ng/mL  Insufficiency 21-29 ng/mL  Sufficiency  ng/mL  Toxicity >100 ng/ml     • Vitamin B-12 05/03/2019 875  211 - 946 pg/mL Final   • Folate 05/03/2019 11.90  4.78 - 24.20 ng/mL Final   • Total Cholesterol 05/03/2019 243* 0 - 200 mg/dL Final   • Triglycerides 05/03/2019 85  0 - 150 mg/dL Final   • HDL Cholesterol 05/03/2019 40  40 - 60 mg/dL Final   • VLDL Cholesterol 05/03/2019 17  mg/dL Final   • LDL Cholesterol  05/03/2019 186* 0 - 100 mg/dL Final   • Free T4 05/03/2019 1.14  0.93 - 1.70 ng/dL Final   • TSH 05/03/2019 1.570  0.270 - 4.200 mIU/mL Final   • Glucose 05/03/2019 82  65 - 99 mg/dL Final   • BUN 05/03/2019 22* 6 - 20 mg/dL Final   • Creatinine 05/03/2019 1.01  0.76 - 1.27 mg/dL Final   • eGFR Non  Am 05/03/2019 89  >60 mL/min/1.73 Final   • eGFR African Am 05/03/2019 107  >60 mL/min/1.73 Final   • BUN/Creatinine Ratio 05/03/2019 21.8  7.0 - 25.0 Final   • Sodium 05/03/2019 138  136 - 145 mmol/L Final   • Potassium 05/03/2019 4.7  3.5 - 5.2 mmol/L Final   • Chloride 05/03/2019 98  98 - 107 mmol/L Final   • Total CO2 05/03/2019 26.5  22.0 - 29.0 mmol/L Final   • Calcium 05/03/2019 10.5  8.6 - 10.5 mg/dL Final   • Total Protein 05/03/2019 7.3  6.0 - 8.5 g/dL Final   • Albumin 05/03/2019 4.90  3.50 - 5.20 g/dL Final   • Globulin 05/03/2019 2.4  gm/dL Final   • A/G  "Ratio 05/03/2019 2.0  g/dL Final   • Total Bilirubin 05/03/2019 0.3  0.2 - 1.2 mg/dL Final   • Alkaline Phosphatase 05/03/2019 153* 39 - 117 U/L Final   • AST (SGOT) 05/03/2019 32  1 - 40 U/L Final   • ALT (SGPT) 05/03/2019 31  1 - 41 U/L Final   • Interpretation 05/03/2019 Note   Final    Supplemental report is available.     Lab Results   Component Value Date    HGBA1C 5.19 04/06/2018    HGBA1C 5.02 06/09/2017     Lab Results   Component Value Date    CREATININE 1.01 05/03/2019     Imaging Results (most recent)     None                Assessment and Plan:    Jay was seen today for hyperlipidemia.    Diagnoses and all orders for this visit:    Mixed hyperlipidemia  -     Hepatic Function Panel; Future  -     Comprehensive Metabolic Panel; Future  -     Vitamin D 25 Hydroxy; Future  -     Vitamin B12 & Folate; Future  -     Lipid Panel; Future  -     TSH; Future  -     T4, Free; Future    Other orders  -     rosuvastatin (CRESTOR) 10 MG tablet; Take 1 tablet by mouth Every Night.      Hyperlipidemia  LDL levels are still high.  Would increase the dosage of Crestor to 10 mg oral daily  Noted that liver function tests have normalized.    Would continue to monitor vitamin D, B12 levels  Noted that vitamin D level was mildly low, will start vitamin D replacement.    Reviewed Lab results with the patient.             Mariia Menjivar MD  05/17/19    EMR Dragon / transcription disclaimer:     \"Dictated utilizing Dragon dictation\".  "

## 2019-06-28 ENCOUNTER — RESULTS ENCOUNTER (OUTPATIENT)
Dept: ENDOCRINOLOGY | Age: 29
End: 2019-06-28

## 2019-06-28 DIAGNOSIS — E78.2 MIXED HYPERLIPIDEMIA: ICD-10-CM

## 2019-06-29 LAB
ALBUMIN SERPL-MCNC: 4.9 G/DL (ref 3.5–5.2)
ALP SERPL-CCNC: 142 U/L (ref 39–117)
ALT SERPL-CCNC: 28 U/L (ref 1–41)
AST SERPL-CCNC: 30 U/L (ref 1–40)
BILIRUB DIRECT SERPL-MCNC: <0.2 MG/DL (ref 0.2–0.3)
BILIRUB SERPL-MCNC: 0.3 MG/DL (ref 0.2–1.2)
PROT SERPL-MCNC: 6.9 G/DL (ref 6–8.5)

## 2019-11-13 ENCOUNTER — RESULTS ENCOUNTER (OUTPATIENT)
Dept: ENDOCRINOLOGY | Age: 29
End: 2019-11-13

## 2019-11-13 DIAGNOSIS — E78.2 MIXED HYPERLIPIDEMIA: ICD-10-CM

## 2019-12-10 LAB
25(OH)D3+25(OH)D2 SERPL-MCNC: 29.7 NG/ML (ref 30–100)
ALBUMIN SERPL-MCNC: 5 G/DL (ref 3.5–5.2)
ALBUMIN/GLOB SERPL: 2.4 G/DL
ALP SERPL-CCNC: 124 U/L (ref 39–117)
ALT SERPL-CCNC: 35 U/L (ref 1–41)
AST SERPL-CCNC: 31 U/L (ref 1–40)
BILIRUB SERPL-MCNC: 0.3 MG/DL (ref 0.2–1.2)
BUN SERPL-MCNC: 14 MG/DL (ref 6–20)
BUN/CREAT SERPL: 13.2 (ref 7–25)
CALCIUM SERPL-MCNC: 9.9 MG/DL (ref 8.6–10.5)
CHLORIDE SERPL-SCNC: 100 MMOL/L (ref 98–107)
CHOLEST SERPL-MCNC: 188 MG/DL (ref 0–200)
CO2 SERPL-SCNC: 28.5 MMOL/L (ref 22–29)
CREAT SERPL-MCNC: 1.06 MG/DL (ref 0.76–1.27)
FOLATE SERPL-MCNC: >20 NG/ML (ref 4.78–24.2)
GLOBULIN SER CALC-MCNC: 2.1 GM/DL
GLUCOSE SERPL-MCNC: 82 MG/DL (ref 65–99)
HDLC SERPL-MCNC: 37 MG/DL (ref 40–60)
INTERPRETATION: NORMAL
LDLC SERPL CALC-MCNC: 140 MG/DL (ref 0–100)
POTASSIUM SERPL-SCNC: 4.6 MMOL/L (ref 3.5–5.2)
PROT SERPL-MCNC: 7.1 G/DL (ref 6–8.5)
SODIUM SERPL-SCNC: 141 MMOL/L (ref 136–145)
T4 FREE SERPL-MCNC: 1.18 NG/DL (ref 0.93–1.7)
TRIGL SERPL-MCNC: 57 MG/DL (ref 0–150)
TSH SERPL DL<=0.005 MIU/L-ACNC: 1.94 UIU/ML (ref 0.27–4.2)
VIT B12 SERPL-MCNC: 1268 PG/ML (ref 211–946)
VLDLC SERPL CALC-MCNC: 11.4 MG/DL

## 2020-05-04 ENCOUNTER — TELEPHONE (OUTPATIENT)
Dept: ENDOCRINOLOGY | Age: 30
End: 2020-05-04

## 2020-05-04 NOTE — TELEPHONE ENCOUNTER
RETURNED PT'S V/M AND S/W PT TO CHANGE APPT WITH DR MARTINEZ ON 5/11/2020 TO PHONE VISIT PER PT REQUEST. HE STATES HE DOES NOT HAVE CAPABILITY FOR MYCHART VIDEO VISIT. PT STATES HE WILL COME TO THE OFFICE FOR LABS ON 5/5/2020.

## 2020-05-05 ENCOUNTER — LAB (OUTPATIENT)
Dept: ENDOCRINOLOGY | Age: 30
End: 2020-05-05

## 2020-05-05 DIAGNOSIS — R53.83 FATIGUE, UNSPECIFIED TYPE: ICD-10-CM

## 2020-05-05 DIAGNOSIS — E78.2 MIXED HYPERLIPIDEMIA: ICD-10-CM

## 2020-05-05 DIAGNOSIS — E78.2 MIXED HYPERLIPIDEMIA: Primary | ICD-10-CM

## 2020-05-06 LAB
25(OH)D3+25(OH)D2 SERPL-MCNC: 23.3 NG/ML (ref 30–100)
ALBUMIN SERPL-MCNC: 4.6 G/DL (ref 3.5–5.2)
ALBUMIN/GLOB SERPL: 2.2 G/DL
ALP SERPL-CCNC: 99 U/L (ref 39–117)
ALT SERPL-CCNC: 38 U/L (ref 1–41)
AST SERPL-CCNC: 31 U/L (ref 1–40)
BILIRUB DIRECT SERPL-MCNC: <0.2 MG/DL (ref 0.2–0.3)
BILIRUB SERPL-MCNC: 0.5 MG/DL (ref 0.2–1.2)
BUN SERPL-MCNC: 18 MG/DL (ref 6–20)
BUN/CREAT SERPL: 16.7 (ref 7–25)
CALCIUM SERPL-MCNC: 9.8 MG/DL (ref 8.6–10.5)
CHLORIDE SERPL-SCNC: 101 MMOL/L (ref 98–107)
CHOLEST SERPL-MCNC: 170 MG/DL (ref 0–200)
CO2 SERPL-SCNC: 26.1 MMOL/L (ref 22–29)
CREAT SERPL-MCNC: 1.08 MG/DL (ref 0.76–1.27)
FOLATE SERPL-MCNC: >20 NG/ML (ref 4.78–24.2)
GLOBULIN SER CALC-MCNC: 2.1 GM/DL
GLUCOSE SERPL-MCNC: 85 MG/DL (ref 65–99)
HDLC SERPL-MCNC: 36 MG/DL (ref 40–60)
INTERPRETATION: NORMAL
LDLC SERPL CALC-MCNC: 115 MG/DL (ref 0–100)
POTASSIUM SERPL-SCNC: 4.4 MMOL/L (ref 3.5–5.2)
PROT SERPL-MCNC: 6.7 G/DL (ref 6–8.5)
SODIUM SERPL-SCNC: 140 MMOL/L (ref 136–145)
T4 FREE SERPL-MCNC: 1.16 NG/DL (ref 0.93–1.7)
TRIGL SERPL-MCNC: 95 MG/DL (ref 0–150)
TSH SERPL DL<=0.005 MIU/L-ACNC: 1.18 UIU/ML (ref 0.27–4.2)
VIT B12 SERPL-MCNC: 999 PG/ML (ref 211–946)
VLDLC SERPL CALC-MCNC: 19 MG/DL (ref 5–40)

## 2020-05-11 ENCOUNTER — TELEMEDICINE (OUTPATIENT)
Dept: ENDOCRINOLOGY | Age: 30
End: 2020-05-11

## 2020-05-11 DIAGNOSIS — E78.2 MIXED HYPERLIPIDEMIA: Primary | ICD-10-CM

## 2020-05-11 PROCEDURE — 99213 OFFICE O/P EST LOW 20 MIN: CPT | Performed by: INTERNAL MEDICINE

## 2020-05-11 RX ORDER — ICOSAPENT ETHYL 500 MG/1
1 CAPSULE, LIQUID FILLED ORAL 2 TIMES DAILY
Qty: 240 CAPSULE | Refills: 3 | Status: SHIPPED | OUTPATIENT
Start: 2020-05-11 | End: 2020-05-18 | Stop reason: SDUPTHER

## 2020-05-11 NOTE — PROGRESS NOTES
30 y.o.    Patient Care Team:  Provider, No Known as PCP - General    Chief Complaint:    FOLLOW UP/ HYPERLIPIDEMIA   Subjective     HPI    Jay Rasheed,29 y.o. WM is here as a follow up pt for hyperlipidemia. Consulted by Dr. Pichardo.   Per pt about a year ago he wanted his PCP to check his cholesterol levels as it runs in his family.   This is a video visit during the covid pandemic.     Today in visit pt reports that he is on crestor 10 mg po daily. He is tolerating the medication with no issues.   Work up showed no issues with LFTs.   No nausea or vomiting.  Patient does have a strong family history.  Disease in his father and his grandfather at age 50s.  No history of smoking.  [place] heart risk for calculation was 4%, but this study does not have patients specific age group.       You have chosen to receive care through a video visit today. Do you consent to use a video visit for your medical care today? Yes      Reviewed primary care physician's/consulting physician documentation and lab results :     Interval History      The following portions of the patient's history were reviewed and updated as appropriate: allergies, current medications, past family history, past medical history, past social history, past surgical history and problem list.    History reviewed. No pertinent past medical history.  Family History   Problem Relation Age of Onset   • Alcohol abuse Mother    • Irregular heart beat Father    • Hypertension Father    • Alcohol abuse Father    • Glaucoma Maternal Grandmother    • Stroke Maternal Grandfather    • Heart attack Paternal Grandfather    • Hypertension Paternal Grandfather    • Pancreatic cancer Paternal Grandfather    • Liver cancer Paternal Grandfather    • Nephrolithiasis Cousin      Social History     Socioeconomic History   • Marital status: Unknown     Spouse name: Not on file   • Number of children: 0   • Years of education: Not on file   • Highest education level: Not on  file   Occupational History   • Occupation:    Tobacco Use   • Smoking status: Never Smoker   • Smokeless tobacco: Never Used   Substance and Sexual Activity   • Alcohol use: Yes     Comment: 3 per week   • Sexual activity: Yes     Partners: Female     Allergies   Allergen Reactions   • Sulfa Antibiotics Hives and Rash       Current Outpatient Medications:   •  rosuvastatin (CRESTOR) 10 MG tablet, Take 1 tablet by mouth Every Night., Disp: 30 tablet, Rfl: 11        Review of Systems   Constitutional: Negative for appetite change, fatigue and fever.   Eyes: Negative for visual disturbance.   Respiratory: Negative for shortness of breath.    Cardiovascular: Positive for palpitations. Negative for leg swelling.   Gastrointestinal: Negative for abdominal pain and vomiting.   Endocrine: Negative for polydipsia and polyuria.   Musculoskeletal: Negative for joint swelling and neck pain.   Skin: Negative for rash.   Neurological: Negative for weakness and numbness.   Psychiatric/Behavioral: Negative for behavioral problems.     I have reviewed the ROS as documented by the MA; Mariia Menjivar MD.      Objective       There were no vitals filed for this visit.  There is no height or weight on file to calculate BMI.      Physical Exam  General appearance - no distress  Eyes-PERRLA, anicteric sclera  Ear nose and throat-external ears and nose normal.  Noted midline trachea.  Respiratory-normal chest on inspection.  No respiratory distress noted.  Musculoskeletal-no edema.  Hammer toes noted.  Skin-no rashes.  Neuro-alert and oriented x3              Results Review:     I reviewed the patient's new clinical results and mentioned them above in HPI and in plan as well.    Medical records reviewed  Summary:Done      Lab on 05/05/2020   Component Date Value Ref Range Status   • Bilirubin, Direct 05/05/2020 <0.2* 0.2 - 0.3 mg/dL Final   • Total Cholesterol 05/05/2020 170  0 - 200 mg/dL Final   • Triglycerides  05/05/2020 95  0 - 150 mg/dL Final   • HDL Cholesterol 05/05/2020 36* 40 - 60 mg/dL Final   • VLDL Cholesterol 05/05/2020 19  5 - 40 mg/dL Final   • LDL Cholesterol  05/05/2020 115* 0 - 100 mg/dL Final   • 25 Hydroxy, Vitamin D 05/05/2020 23.3* 30.0 - 100.0 ng/ml Final    Comment: Results may be falsely increased if patient taking Biotin.  Reference Range for Total Vitamin D 25(OH)  Deficiency <20.0 ng/mL  Insufficiency 21-29 ng/mL  Sufficiency  ng/mL  Toxicity >100 ng/ml     • Folate 05/05/2020 >20.00  4.78 - 24.20 ng/mL Final    Results may be falsely increased if patient taking Biotin.   • Vitamin B-12 05/05/2020 999* 211 - 946 pg/mL Final    Results may be falsely increased if patient taking Biotin.   • Glucose 05/05/2020 85  65 - 99 mg/dL Final   • BUN 05/05/2020 18  6 - 20 mg/dL Final   • Creatinine 05/05/2020 1.08  0.76 - 1.27 mg/dL Final   • eGFR Non African Am 05/05/2020 81  >60 mL/min/1.73 Final   • eGFR African Am 05/05/2020 99  >60 mL/min/1.73 Final   • BUN/Creatinine Ratio 05/05/2020 16.7  7.0 - 25.0 Final   • Sodium 05/05/2020 140  136 - 145 mmol/L Final   • Potassium 05/05/2020 4.4  3.5 - 5.2 mmol/L Final   • Chloride 05/05/2020 101  98 - 107 mmol/L Final   • Total CO2 05/05/2020 26.1  22.0 - 29.0 mmol/L Final   • Calcium 05/05/2020 9.8  8.6 - 10.5 mg/dL Final   • Total Protein 05/05/2020 6.7  6.0 - 8.5 g/dL Final   • Albumin 05/05/2020 4.60  3.50 - 5.20 g/dL Final   • Globulin 05/05/2020 2.1  gm/dL Final   • A/G Ratio 05/05/2020 2.2  g/dL Final   • Total Bilirubin 05/05/2020 0.5  0.2 - 1.2 mg/dL Final   • Alkaline Phosphatase 05/05/2020 99  39 - 117 U/L Final   • AST (SGOT) 05/05/2020 31  1 - 40 U/L Final   • ALT (SGPT) 05/05/2020 38  1 - 41 U/L Final   • TSH 05/05/2020 1.180  0.270 - 4.200 uIU/mL Final    Results may be falsely decreased if patient taking Biotin   • Free T4 05/05/2020 1.16  0.93 - 1.70 ng/dL Final    Results may be falsely increased if patient taking Biotin.   • Interpretation  "05/05/2020 Note   Final    Supplemental report is available.     Lab Results   Component Value Date    HGBA1C 5.19 04/06/2018    HGBA1C 5.02 06/09/2017     Lab Results   Component Value Date    CREATININE 1.08 05/05/2020     Imaging Results (Most Recent)     None                Assessment and Plan:    Jay was seen today for hyperlipidemia.    Diagnoses and all orders for this visit:    Mixed hyperlipidemia  -     Lipid Panel; Future      Hyperlipidemia -levels not at goal.  LDL is still high.  Continue Crestor 10 mg oral daily  Add vascepa for the improvement of the HDL.    Reviewed Lab results with the patient.         10   ( greater than 50% of the time) out of  15 minutes  spent counseling the patient on medication changes and treatment plan, work-up that is necessary.    Mariia Menjivar MD  05/11/20    EMR Dragon / transcription disclaimer:     \"Dictated utilizing Dragon dictation\".      "

## 2020-05-18 ENCOUNTER — TELEPHONE (OUTPATIENT)
Dept: ENDOCRINOLOGY | Age: 30
End: 2020-05-18

## 2020-05-18 RX ORDER — ICOSAPENT ETHYL 500 MG/1
1 CAPSULE, LIQUID FILLED ORAL 2 TIMES DAILY
Qty: 180 CAPSULE | Refills: 3 | Status: SHIPPED | OUTPATIENT
Start: 2020-05-18 | End: 2020-06-03 | Stop reason: SDUPTHER

## 2020-05-18 RX ORDER — ROSUVASTATIN CALCIUM 10 MG/1
10 TABLET, COATED ORAL NIGHTLY
Qty: 90 TABLET | Refills: 3 | Status: SHIPPED | OUTPATIENT
Start: 2020-05-18 | End: 2020-11-24

## 2020-05-18 NOTE — PROGRESS NOTES
S/W PT TO SCHEDULE NEXT APPT WITH DR MARTINEZ. PT STATES DR MARTINEZ WAS SUPPOSED TO PRESCRIBE SOME FISH OIL AND RECHECK LABS IN A COUPLE OF MONTHS. MSG SENT TO DR MARTINEZ FOR VERIFICATION.

## 2020-05-18 NOTE — TELEPHONE ENCOUNTER
Spoke with patient about medication and lab appt patient voiced understanding    ----- Message from Mariia Menjivar MD sent at 5/18/2020  2:28 PM EDT -----  Contact: KAL PERALTA  He does have the vascepa in his outpatient meds as his prescription which is the new medication to his Crestor.  Lipid panel in 3 months.  ----- Message -----  From: Miley Hollins MA  Sent: 5/18/2020   2:22 PM EDT  To: Mariia Menjivar MD        ----- Message -----  From: Wilfrid Damian  Sent: 5/18/2020  10:34 AM EDT  To: Miley Hollins MA    I s/w pt to schedule his next f/u appt with Dr Menjivar. Pt states that Dr Menjivar was supposed to prescribe him some fish oil medication and wants to recheck some labs in a couple of months. Please advise.

## 2020-06-03 DIAGNOSIS — E78.2 MIXED HYPERLIPIDEMIA: Primary | ICD-10-CM

## 2020-06-03 RX ORDER — ICOSAPENT ETHYL 500 MG/1
1 CAPSULE, LIQUID FILLED ORAL 2 TIMES DAILY
Qty: 180 CAPSULE | Refills: 3 | Status: SHIPPED | OUTPATIENT
Start: 2020-06-03 | End: 2021-05-27

## 2020-08-09 ENCOUNTER — RESULTS ENCOUNTER (OUTPATIENT)
Dept: ENDOCRINOLOGY | Age: 30
End: 2020-08-09

## 2020-08-09 DIAGNOSIS — E78.2 MIXED HYPERLIPIDEMIA: ICD-10-CM

## 2020-11-07 ENCOUNTER — RESULTS ENCOUNTER (OUTPATIENT)
Dept: ENDOCRINOLOGY | Age: 30
End: 2020-11-07

## 2020-11-07 DIAGNOSIS — E78.2 MIXED HYPERLIPIDEMIA: ICD-10-CM

## 2020-11-24 RX ORDER — ROSUVASTATIN CALCIUM 10 MG/1
TABLET, COATED ORAL
Qty: 90 TABLET | Refills: 0 | Status: SHIPPED | OUTPATIENT
Start: 2020-11-24 | End: 2021-02-22

## 2021-02-23 RX ORDER — ROSUVASTATIN CALCIUM 10 MG/1
TABLET, COATED ORAL
Qty: 90 TABLET | Refills: 0 | Status: SHIPPED | OUTPATIENT
Start: 2021-02-23 | End: 2021-05-24

## 2021-05-24 RX ORDER — ROSUVASTATIN CALCIUM 10 MG/1
TABLET, COATED ORAL
Qty: 90 TABLET | Refills: 3 | Status: SHIPPED | OUTPATIENT
Start: 2021-05-24

## 2021-05-27 DIAGNOSIS — E78.2 MIXED HYPERLIPIDEMIA: ICD-10-CM

## 2021-05-27 RX ORDER — ICOSAPENT ETHYL 500 MG/1
CAPSULE ORAL
Qty: 180 CAPSULE | Refills: 3 | Status: SHIPPED | OUTPATIENT
Start: 2021-05-27

## 2022-04-29 DIAGNOSIS — E78.2 MIXED HYPERLIPIDEMIA: ICD-10-CM

## 2022-04-29 RX ORDER — ICOSAPENT ETHYL 500 MG/1
CAPSULE ORAL
Qty: 180 CAPSULE | Refills: 3 | OUTPATIENT
Start: 2022-04-29

## 2022-05-23 RX ORDER — ROSUVASTATIN CALCIUM 10 MG/1
TABLET, COATED ORAL
Qty: 90 TABLET | Refills: 3 | OUTPATIENT
Start: 2022-05-23